# Patient Record
(demographics unavailable — no encounter records)

---

## 2024-11-01 NOTE — CONSULT LETTER
[Dear  ___] : Dear  [unfilled], [Courtesy Letter:] : I had the pleasure of seeing your patient, [unfilled], in my office today. [Please see my note below.] : Please see my note below. [Consult Closing:] : Thank you very much for allowing me to participate in the care of this patient.  If you have any questions, please do not hesitate to contact me. [Sincerely,] : Sincerely, [FreeTextEntry1] : The patient does not present with any active or acute pulmonary contraindications to the proposed surgical procedure.  [FreeTextEntry3] : Dr. Liz Gonzales

## 2024-11-01 NOTE — PHYSICAL EXAM
[No Acute Distress] : no acute distress [Well Nourished] : well nourished [Well Developed] : well developed [Well-Appearing] : well-appearing [Normal Sclera/Conjunctiva] : normal sclera/conjunctiva [No Lymphadenopathy] : no lymphadenopathy [Thyroid Normal, No Nodules] : the thyroid was normal and there were no nodules present [No Respiratory Distress] : no respiratory distress  [No Accessory Muscle Use] : no accessory muscle use [Normal Rate] : normal rate  [Regular Rhythm] : with a regular rhythm [Normal S1, S2] : normal S1 and S2 [No Murmur] : no murmur heard [No Carotid Bruits] : no carotid bruits [No Abdominal Bruit] : a ~M bruit was not heard ~T in the abdomen [No Edema] : there was no peripheral edema [Soft] : abdomen soft [Non Tender] : non-tender [Non-distended] : non-distended [Normal Bowel Sounds] : normal bowel sounds [Normal Supraclavicular Nodes] : no supraclavicular lymphadenopathy [Normal Posterior Cervical Nodes] : no posterior cervical lymphadenopathy [Normal Anterior Cervical Nodes] : no anterior cervical lymphadenopathy [No Spinal Tenderness] : no spinal tenderness [Normal Gait] : normal gait [Alert and Oriented x3] : oriented to person, place, and time [de-identified] : Scattered wheezing throughout lung fields

## 2024-11-01 NOTE — REVIEW OF SYSTEMS
[Fever] : no fever [Chills] : no chills [Fatigue] : no fatigue [Night Sweats] : no night sweats [Vision Problems] : no vision problems [Chest Pain] : no chest pain [Palpitations] : no palpitations [Shortness Of Breath] : no shortness of breath [Wheezing] : no wheezing [Cough] : no cough [Abdominal Pain] : no abdominal pain [Nausea] : no nausea [Constipation] : no constipation [Diarrhea] : diarrhea [Vomiting] : no vomiting [Dysuria] : no dysuria [Hematuria] : no hematuria [Joint Pain] : no joint pain [Muscle Pain] : no muscle pain [Headache] : no headache [Dizziness] : no dizziness

## 2024-11-01 NOTE — PROCEDURE
[FreeTextEntry1] : PFT performed today, Oct 30 2024  1:30PM in my office . Spirometry: Moderate obstructive airway disesae Lung Volume: Within normal limits with air trapping Diffusion: Within normal limits -------------- Exhaled Nitric Oxide             Final  No Documents Attached    	Test  	Result  	Flag	Reference	Goal	Last Verified  	Exhaled Nitric Oxide	154	 	 		REQUIRED  Ordered by: CRYSTAL RIVERA       Collected/Examined: 30Oct2024 01:44PM       Verification Required       Stage: Final       Performed at: In Office       Performed by: CRYSTAL RIVERA       Resulted: 30Oct2024 01:44PM       Last Updated: 30Oct2024 01:45PM

## 2024-11-01 NOTE — PHYSICAL EXAM
[No Acute Distress] : no acute distress [Well Nourished] : well nourished [Well Developed] : well developed [Well-Appearing] : well-appearing [Normal Sclera/Conjunctiva] : normal sclera/conjunctiva [No Lymphadenopathy] : no lymphadenopathy [Thyroid Normal, No Nodules] : the thyroid was normal and there were no nodules present [No Respiratory Distress] : no respiratory distress  [No Accessory Muscle Use] : no accessory muscle use [Normal Rate] : normal rate  [Regular Rhythm] : with a regular rhythm [Normal S1, S2] : normal S1 and S2 [No Murmur] : no murmur heard [No Carotid Bruits] : no carotid bruits [No Abdominal Bruit] : a ~M bruit was not heard ~T in the abdomen [No Edema] : there was no peripheral edema [Soft] : abdomen soft [Non Tender] : non-tender [Non-distended] : non-distended [Normal Bowel Sounds] : normal bowel sounds [Normal Supraclavicular Nodes] : no supraclavicular lymphadenopathy [Normal Posterior Cervical Nodes] : no posterior cervical lymphadenopathy [Normal Anterior Cervical Nodes] : no anterior cervical lymphadenopathy [No Spinal Tenderness] : no spinal tenderness [Normal Gait] : normal gait [Alert and Oriented x3] : oriented to person, place, and time [de-identified] : Scattered wheezing throughout lung fields

## 2024-11-01 NOTE — PHYSICAL EXAM
[No Acute Distress] : no acute distress [Well Nourished] : well nourished [Well Developed] : well developed [Well-Appearing] : well-appearing [Normal Sclera/Conjunctiva] : normal sclera/conjunctiva [No Lymphadenopathy] : no lymphadenopathy [Thyroid Normal, No Nodules] : the thyroid was normal and there were no nodules present [No Respiratory Distress] : no respiratory distress  [No Accessory Muscle Use] : no accessory muscle use [Normal Rate] : normal rate  [Regular Rhythm] : with a regular rhythm [Normal S1, S2] : normal S1 and S2 [No Murmur] : no murmur heard [No Carotid Bruits] : no carotid bruits [No Abdominal Bruit] : a ~M bruit was not heard ~T in the abdomen [No Edema] : there was no peripheral edema [Soft] : abdomen soft [Non Tender] : non-tender [Non-distended] : non-distended [Normal Bowel Sounds] : normal bowel sounds [Normal Supraclavicular Nodes] : no supraclavicular lymphadenopathy [Normal Posterior Cervical Nodes] : no posterior cervical lymphadenopathy [Normal Anterior Cervical Nodes] : no anterior cervical lymphadenopathy [No Spinal Tenderness] : no spinal tenderness [Normal Gait] : normal gait [Alert and Oriented x3] : oriented to person, place, and time [de-identified] : Scattered wheezing throughout lung fields

## 2024-11-01 NOTE — ADDENDUM
[FreeTextEntry1] : I, Randy Guillermo, acted solely as a scribe for Dr. Liz Gonzales D.O. on this date 10/30/2024.   All medical record entries made by the Scribe were at my, Dr. Liz Gonzales D.O., direction and personally dictated by me on 10/30/2024. I have reviewed the chart and agree that the record accurately reflects my personal performance of the history, physical exam, assessment and plan. I have also personally directed, reviewed, and agreed with the chart.

## 2024-11-01 NOTE — REASON FOR VISIT
[Initial] : an initial visit [Consultation] : a consultation [Wheezing] : wheezing [TextBox_13] : Dr. Sandy Ballesteros

## 2024-11-01 NOTE — ASSESSMENT
[FreeTextEntry1] : Dr. Gonzales reviewed with DEAN her PFT and NiOx in office today. PFT and NiOx were both consistent with significant asthma exacerbation.   Dr. Gonzales reviewed with DEAN her chest x-ray in office today. Chest x-ray was unremarkable.  Dr. Gonzales's assessment: Dean should begin using symbicort on a regular basis, two puffs twice a day. Dean should also begin a three day course of prednisone of 40 mg daily. DEAN should return in 1 month for follow up.

## 2024-11-01 NOTE — ASSESSMENT
[Patient Requires Further Testing] : Patient requires further testing [Other: _____] : [unfilled] [As per surgery] : as per surgery [Patient NOT optimized for Surgery at this time] : Patient not optimized for surgery at this time [Patient Optimized for Surgery] : Patient optimized for surgery [FreeTextEntry4] : Vanesa is here today for preop assessment for planned robot-assisted laparoscopic cholecystectomy.  She is seen today after a 2-year hiatus.  Her exam is normal for wheezing.  Labs are notable for microcytic anemia.  She was post to do an iron panel back in 2022, though not done.  Was told genetic testing was completely normal from her fertility specialist at that time.  I suspect she has mild iron deficiency from heavy menses in the setting of fibroids.  She will complete iron panel and stool FIT.  For her wheezing, she only uses albuterol as needed.  She may need controller medication.  She has not had PFTs in years.  I have ordered chest x-ray request that she have pulmonary consultation prior to surgery.  She never started labetalol given that her BP is normal today, will not issue prior to surgery.  No NSAIDs, aspirin, vitamins/over-the-counter's until after surgery.  Hold Vyvanse and Adderall which she is only taking as needed on morning of surgery.  Addendum: Pulm preoperative assessment attached

## 2024-11-01 NOTE — HISTORY OF PRESENT ILLNESS
[Former] : former [TextBox_4] : DEAN YEE is a 45 year female with history of asthma who presents to the office for an initial evaluation. Jeannine complains of occasional wheezing and postnasal drip.  She is a non-smoker. [YearQuit] : 2022

## 2024-11-12 NOTE — ASSESSMENT
[FreeTextEntry1] : Routine and unremarkable postoperative follow-up status post robotic assisted laparoscopic cholecystectomy for biliary dyskinesia.  Patient returns with no adverse events or symptoms.  Abdomen remains soft, nontender nondistended, positive bowel sounds in all four quads.  No hernia or masses.  Surgical incisions remain well approximated and healing appropriately without erythema, induration or fluctuance.  Pathology has been reviewed and findings discussed with the patient consistent with biliary dyskinesia.  Gallbladder demonstrated no significant pathological changes, a calculus.  These findings are benign and require no further surgical intervention.  Postoperative instructions have been provided including avoidance of heavy lifting and strenuous activities in excess of 20-25 pounds for duration of 4-6 weeks. The patient may shower keeping the incisions clean, dry, covered as needed.  Return to work as tolerated.  Will provide patient a letter postponing her gym membership until December 4.  Follow-up as needed

## 2024-12-03 NOTE — REASON FOR VISIT
[Follow-Up] : a follow-up visit [Wheezing] : wheezing [Abnormal CXR/ Chest CT] : an abnormal CXR/ chest CT [Asthma] : asthma [TextBox_13] : Dr. Sandy Ballesteros

## 2024-12-03 NOTE — HISTORY OF PRESENT ILLNESS
[Former] : former [TextBox_4] : DEAN YEE is a 45 year female with history of asthma who presents to the office for follow up evaluation. DEAN is overall feeling well at this time; no respiratory complaints. Jeannine notes her compliance with her symbicort regimen.  She is a former smoker. [YearQuit] : 2022

## 2024-12-03 NOTE — ASSESSMENT
[FreeTextEntry1] : Dr. Gonzales reviewed with DEAN her PFT and NiOx in office today. PFT was improved compared to baseline but NiOx remained elevated, albeit improved (lower).   Dr. Gonzales's assessment: Dean should continue using symbicort on a regular basis, two puffs twice a day. DEAN should return in 1 month for follow up.

## 2024-12-03 NOTE — ADDENDUM
[FreeTextEntry1] : I, Randy Li, acted solely as a scribe for Dr. Liz Gonzales D.O. on this date 12/03/2024.   All medical record entries made by the Scribe were at my, Dr. Liz Gonzales D.O., direction and personally dictated by me on 12/03/2024. I have reviewed the chart and agree that the record accurately reflects my personal performance of the history, physical exam, assessment and plan. I have also personally directed, reviewed, and agreed with the chart.

## 2024-12-03 NOTE — PROCEDURE
[FreeTextEntry1] : PFT performed today, Dec  3 2024  1:10PM in my office . Spirometry: Suggestive of mild restrictive defect -------------- Exhaled Nitric Oxide             Final  No Documents Attached    	Test  	Result  	Flag	Reference	Goal	Last Verified  	Exhaled Nitric Oxide	112	 	 		REQUIRED  Ordered by: CRYSTAL RIVERA       Collected/Examined: [id number] 01:46PM       Verification Required       Stage: Final       Performed at: In Office       Performed by: CRYSTAL RIVERA       Resulted: [id number] 01:46PM       Last Updated: [id number] 01:46PM

## 2025-02-06 NOTE — PROCEDURE
[FreeTextEntry1] : Pulmonary Function Test performed today, 02/05/2025, in my office: Spirometry: Suggestive of mild restrictive defect. ____________________________________________________________  Exhaled Nitric Oxide             Final  No Documents Attached    	Test  	Result  	Flag	Reference	Goal	Last Verified  	Exhaled Nitric Oxide	78	 	 		REQUIRED  Ordered by: CRYSTAL RIVERA       Collected/Examined: [id number] 07:14PM       Verification Required       Stage: Final       Performed at: In Office       Performed by: ANA SEGURA       Resulted: [id number] 07:14PM       Last Updated: [id number] 07:18PM       Results Hx:	 Goal	[id number] Item Name		07:14PM	01:46PM	01:44PM Exhaled Nitric Oxide		78	112	154  * indicates comments or annotations. Hover * or Report to view full result. Right click on result to view in new window.

## 2025-02-06 NOTE — SIGNATURES
[TextEntry] : This note was written by Rama Pizarro on 02/05/2025 acting as medical scribe for Dr. Liz Gonzales. I, Dr. Liz Gonzales, have read and attest that all the information, medical decision making and discharge instructions within are true and accurate.

## 2025-02-06 NOTE — REASON FOR VISIT
[Follow-Up] : a follow-up visit [Abnormal CXR/ Chest CT] : an abnormal CXR/ chest CT [Asthma] : asthma [Wheezing] : wheezing [TextBox_13] : Dr. Sandy Ballesteros

## 2025-02-06 NOTE — ASSESSMENT
[FreeTextEntry1] : Dr. Gonzales reviewed with DEAN her PFT and NiOx in office today. PFT was improved compared to baseline and NiOx significantly improved, albeit still elevated.   Dr. Gonzales's assessment: Dean should continue using symbicort on a regular basis, two puffs twice a day. DEAN should return in 6 weeks for follow up.  Advised Dean to follow-up with her PCP Dr. Ballesteros regarding hypertension.

## 2025-02-27 NOTE — HISTORY OF PRESENT ILLNESS
[FreeTextEntry1] :  45 year old  female presents for annual. Last seen for annual in 2022 and had a negative pap/HPV. H/o 2 TOPs. Has breast implants. Has family h/o breast cancer in her PGM and MAunt. Has a SM fibroid - had a surgical consult and decided against surgery. Has been dx with KAITLIN. Reports heavy periods. States no longer pursuing fertility treatment. Had her gallbladder removed in November. Notes is getting  this year.   She works as a nurse  OBHx: TOP x2 (, ) GYNHx: h/o ovarian cysts  PMHx: KAITLIN, ADHD (taking Vyvanse and Adderall), HTN (on Labetalol 100 BID) PSHx: bilateral breast implants, cholecystectomy(2024)  FHx: DM-father, breast CA-PGM & MAunt SocHx: former cigarette smoker , works in [employer]  MedHx: Labetalol 100 BID, Vyvanse, Adderall Allergies: NKDA [General Appearance - Alert] : alert [PapSmeardate] : 8/2022 [General Appearance - In No Acute Distress] : in no acute distress [Oriented To Time, Place, And Person] : oriented to person, place, and time [Impaired Insight] : insight and judgment were intact [Affect] : the affect was normal [Person] : oriented to person [Place] : oriented to place [Time] : oriented to time [Concentration Intact] : normal concentrating ability [Visual Intact] : visual attention was ~T not ~L decreased [Naming Objects] : no difficulty naming common objects [Repeating Phrases] : no difficulty repeating a phrase [Writing A Sentence] : no difficulty writing a sentence [Fluency] : fluency intact [Comprehension] : comprehension intact [Reading] : reading intact [Past History] : adequate knowledge of personal past history [Cranial Nerves Optic (II)] : visual acuity intact bilaterally,  visual fields full to confrontation, pupils equal round and reactive to light [Cranial Nerves Oculomotor (III)] : extraocular motion intact [Cranial Nerves Facial (VII)] : face symmetrical [Cranial Nerves Trigeminal (V)] : facial sensation intact symmetrically [Cranial Nerves Vestibulocochlear (VIII)] : hearing was intact bilaterally [Cranial Nerves Glossopharyngeal (IX)] : tongue and palate midline [Cranial Nerves Accessory (XI - Cranial And Spinal)] : head turning and shoulder shrug symmetric [Cranial Nerves Hypoglossal (XII)] : there was no tongue deviation with protrusion [Motor Strength] : muscle strength was normal in all four extremities [No Muscle Atrophy] : normal bulk in all four extremities [Sensation Tactile Decrease] : light touch was intact [Balance] : balance was intact [2+] : Ankle jerk left 2+ [Sclera] : the sclera and conjunctiva were normal [PERRL With Normal Accommodation] : pupils were equal in size, round, reactive to light, with normal accommodation [Extraocular Movements] : extraocular movements were intact [Outer Ear] : the ears and nose were normal in appearance [Oropharynx] : the oropharynx was normal [Neck Appearance] : the appearance of the neck was normal [Neck Cervical Mass (___cm)] : no neck mass was observed [Jugular Venous Distention Increased] : there was no jugular-venous distention [Thyroid Diffuse Enlargement] : the thyroid was not enlarged [Thyroid Nodule] : there were no palpable thyroid nodules [] : no respiratory distress [Respiration, Rhythm And Depth] : normal respiratory rhythm and effort [Edema] : there was no peripheral edema [No Spinal Tenderness] : no spinal tenderness [Abnormal Walk] : normal gait [Nail Clubbing] : no clubbing  or cyanosis of the fingernails [Musculoskeletal - Swelling] : no joint swelling seen [Motor Tone] : muscle strength and tone were normal [Cranial Nerves Facial Peripheral - Left Only] : peripheral 7th nerve weakness [Past-pointing] : there was no past-pointing [Tremor] : no tremor present [Plantar Reflex Right Only] : normal on the right [Plantar Reflex Left Only] : normal on the left

## 2025-02-27 NOTE — PLAN
[FreeTextEntry1] :  45 year old  female presents for annual.  -Pap/HPV -Referred for mammo -Advised colonoscopy I counseled the patient regarding treatment options for heavy menstrual bleeding including tranexamic acid and hormonal options, specifically combined oral contraceptive pills, patch and ring, all with option for extended cycle or continuous dosing, and levonorgestrel intrauterine device.  I reviewed the risks, potential side effects and benefits of each. -Start TXA -f/u with PCP re BP  F/u in 1 yr.

## 2025-02-27 NOTE — END OF VISIT
[FreeTextEntry3] :  I, Madison Tsang, acted as a scribe on behalf of Dr. Ann Marie Brewer M.D. on 02/26/2025.   All medical entries made by the scribe were at my, Dr. Ann Marie Brewer M.D., direction and personally dictated by me on 02/26/2025. I have reviewed the chart and agree that the record accurately reflects my personal performance of the history, physical exam, assessment and plan. I have also personally directed, reviewed, and agreed with the chart.

## 2025-02-27 NOTE — HISTORY OF PRESENT ILLNESS
[FreeTextEntry1] :  45 year old  female presents for annual. Last seen for annual in 2022 and had a negative pap/HPV. H/o 2 TOPs. Has breast implants. Has family h/o breast cancer in her PGM and MAunt. Has a SM fibroid - had a surgical consult and decided against surgery. Has been dx with KAITLIN. Reports heavy periods. States no longer pursuing fertility treatment. Had her gallbladder removed in November. Notes is getting  this year.   She works as a nurse  OBHx: TOP x2 (, ) GYNHx: h/o ovarian cysts  PMHx: KAITLIN, ADHD (taking Vyvanse and Adderall), HTN (on Labetalol 100 BID) PSHx: bilateral breast implants, cholecystectomy(2024)  FHx: DM-father, breast CA-PGM & MAunt SocHx: former cigarette smoker , works in [employer]  MedHx: Labetalol 100 BID, Vyvanse, Adderall Allergies: NKDA [PapSmeardate] : 8/2022

## 2025-03-19 NOTE — REASON FOR VISIT
[Follow-Up] : a follow-up visit [Abnormal CXR/ Chest CT] : an abnormal CXR/ chest CT [Asthma] : asthma [Wheezing] : wheezing